# Patient Record
Sex: FEMALE | Race: WHITE | NOT HISPANIC OR LATINO | Employment: STUDENT | URBAN - METROPOLITAN AREA
[De-identification: names, ages, dates, MRNs, and addresses within clinical notes are randomized per-mention and may not be internally consistent; named-entity substitution may affect disease eponyms.]

---

## 2018-07-31 ENCOUNTER — OFFICE VISIT (OUTPATIENT)
Dept: FAMILY MEDICINE CLINIC | Facility: HOSPITAL | Age: 18
End: 2018-07-31
Payer: COMMERCIAL

## 2018-07-31 VITALS
DIASTOLIC BLOOD PRESSURE: 62 MMHG | HEIGHT: 65 IN | WEIGHT: 142 LBS | BODY MASS INDEX: 23.66 KG/M2 | TEMPERATURE: 98.5 F | SYSTOLIC BLOOD PRESSURE: 94 MMHG | HEART RATE: 81 BPM

## 2018-07-31 DIAGNOSIS — Z82.49 FAMILY HISTORY OF CARDIOVASCULAR DISEASE: ICD-10-CM

## 2018-07-31 DIAGNOSIS — N83.209 CYST OF OVARY, UNSPECIFIED LATERALITY: ICD-10-CM

## 2018-07-31 DIAGNOSIS — Z00.00 ENCOUNTER FOR WELLNESS EXAMINATION IN ADULT: Primary | ICD-10-CM

## 2018-07-31 PROBLEM — N92.0 MENORRHAGIA WITH REGULAR CYCLE: Status: ACTIVE | Noted: 2018-07-31

## 2018-07-31 PROCEDURE — 99203 OFFICE O/P NEW LOW 30 MIN: CPT | Performed by: INTERNAL MEDICINE

## 2018-07-31 NOTE — PROGRESS NOTES
Assessment/Plan:             Problem List Items Addressed This Visit        Genitourinary    Ovarian cyst     Seen by [facility] for evaluation May 2018- no hx of abnormal  Pap  Other    Family history of cardiovascular disease     Will check lipid profile           Other Visit Diagnoses     Encounter for wellness examination in adult    -  Primary            Subjective:      Patient ID: Manuelito Jara is a 21 y o  female    1  New patient here for intial wellness visit- at [facility] in [place]- plays soccer- defender- no injuries or joint pains  Has school physical to complete as well we are awaiting her records of immunization from Dr Liseth Kay office  2  Back pain abdominal pain with fever- went to urgent care in [place] and had temp to 104 - was sent to ER at [facility] and had workup which showed an ovairain cyst and then discharged home with no antibiotics-had 2  uti in past year  ( is sexually acitive)  Did have labs in Er        The following portions of the patient's history were reviewed and updated as appropriate: allergies, current medications and problem list    Has o 1 borother in good health  Review of Systems   Constitutional: Negative for fatigue  Genitourinary: Positive for menstrual problem  Some heavy flow for 1-2 days and some cramping- uses advil prn  Sees [facility]  All other systems reviewed and are negative  Objective:    No current outpatient prescriptions on file  Blood pressure 94/62, pulse 81, temperature 98 5 °F (36 9 °C), temperature source Tympanic, height 5' 5" (1 651 m), weight 64 4 kg (142 lb)  Physical Exam   Constitutional: She is oriented to person, place, and time  She appears well-developed and well-nourished  No distress  HENT:   Head: Normocephalic     Right Ear: External ear normal    Left Ear: External ear normal    Mouth/Throat: Oropharynx is clear and moist    Eyes: EOM are normal  Pupils are equal, round, and reactive to light  Right eye exhibits no discharge  Left eye exhibits no discharge  Neck: No JVD present  Cardiovascular: Normal rate, regular rhythm and normal heart sounds  Exam reveals no friction rub  No murmur heard  Pulmonary/Chest: Effort normal and breath sounds normal  No respiratory distress  She has no wheezes  Abdominal: Soft  Bowel sounds are normal  She exhibits no distension  There is no tenderness  Musculoskeletal: Normal range of motion  She exhibits no edema or deformity  No joint swelling or tenderness  No scoliosis of spine noted   Lymphadenopathy:     She has no cervical adenopathy  Neurological: She is alert and oriented to person, place, and time  No cranial nerve deficit  Coordination normal    Skin: Skin is warm and dry  No erythema  No pallor  Psychiatric: She has a normal mood and affect  Her behavior is normal  Thought content normal    Nursing note and vitals reviewed

## 2018-07-31 NOTE — PATIENT INSTRUCTIONS
Sign release of records from ER visit earlier this year at [facility] including labs and radiology reports please  Will have labs for lipid done in future as father has hyperlipidemia   Mother has PCOS-   Get flu shot in fall

## 2018-09-25 ENCOUNTER — OFFICE VISIT (OUTPATIENT)
Dept: FAMILY MEDICINE CLINIC | Facility: HOSPITAL | Age: 18
End: 2018-09-25
Payer: COMMERCIAL

## 2018-09-25 VITALS
DIASTOLIC BLOOD PRESSURE: 60 MMHG | SYSTOLIC BLOOD PRESSURE: 106 MMHG | HEIGHT: 65 IN | OXYGEN SATURATION: 99 % | HEART RATE: 90 BPM | BODY MASS INDEX: 23.49 KG/M2 | WEIGHT: 141 LBS

## 2018-09-25 DIAGNOSIS — F33.2 SEVERE EPISODE OF RECURRENT MAJOR DEPRESSIVE DISORDER, WITHOUT PSYCHOTIC FEATURES (HCC): Primary | ICD-10-CM

## 2018-09-25 PROCEDURE — 3008F BODY MASS INDEX DOCD: CPT | Performed by: PHYSICIAN ASSISTANT

## 2018-09-25 PROCEDURE — 99213 OFFICE O/P EST LOW 20 MIN: CPT | Performed by: PHYSICIAN ASSISTANT

## 2018-09-25 RX ORDER — CITALOPRAM 10 MG/1
10 TABLET ORAL DAILY
Qty: 30 TABLET | Refills: 2 | Status: SHIPPED | OUTPATIENT
Start: 2018-09-25 | End: 2018-10-11 | Stop reason: SDUPTHER

## 2018-09-25 NOTE — PROGRESS NOTES
Assessment/Plan:         Diagnoses and all orders for this visit:    Severe episode of recurrent major depressive disorder, without psychotic features ([facility] )  -     citalopram (CeleXA) 10 mg tablet; Take 1 tablet (10 mg total) by mouth daily        Subjective:      Patient ID: Luis Mcclelland is a 19 y o  female  19year old white female presents with mother;  Feeling depressed, worse past few weeks;    Per mother-  Anxiety and depression runs in the family, patient had anxiety after starting school  First year of college, last year, patient felt anxious and somewhat depressed, but did not want to take any medications  This is second year in college, going to [employer],  [employer]  Having trouble sleeping, low appetite, (skipping meals);   low motivation, "I just don't want to do anything"  Has to play soccer to pay for tuition, has to practice daily, including Sunday, and Saturday has games  Has trouble focusing in class, but able to keep up with school work  Tried few days of dad's Lexapro (generic)  Talks to , counseling  LNMP- 9/24/18  Depression   Associated symptoms include congestion, fatigue, nausea and a sore throat  Pertinent negatives include no abdominal pain, coughing or vomiting  Review of Systems   Constitutional: Positive for appetite change and fatigue  HENT: Positive for congestion, rhinorrhea and sore throat  Negative for ear pain  Respiratory: Negative for cough and shortness of breath  Gastrointestinal: Positive for nausea  Negative for abdominal pain and vomiting  Psychiatric/Behavioral: Positive for confusion, decreased concentration, depression, self-injury, sleep disturbance and suicidal ideas  Crashing car, trying to end life  Denies being in any crashes             Objective:      /60 (BP Location: Left arm, Patient Position: Sitting, Cuff Size: Standard)   Pulse 90   Ht 5' 5" (1 651 m)   Wt 64 kg (141 lb)   SpO2 99%   BMI 23 46 kg/m²          Physical Exam   Constitutional: She is oriented to person, place, and time  She appears well-developed and well-nourished  No distress  HENT:   Head: Normocephalic and atraumatic  Eyes: Conjunctivae and EOM are normal  Right eye exhibits no discharge  Left eye exhibits no discharge  No scleral icterus  Cardiovascular: Normal rate, regular rhythm and normal heart sounds  Pulmonary/Chest: Effort normal and breath sounds normal  No respiratory distress  She has no wheezes  She has no rales  Musculoskeletal: Normal range of motion  Neurological: She is alert and oriented to person, place, and time  Skin: She is not diaphoretic  Psychiatric: Her behavior is normal  Judgment and thought content normal    Low mood  Nursing note and vitals reviewed

## 2018-09-25 NOTE — PATIENT INSTRUCTIONS
Given info on depression  Patient to start Celexa daily, can try Melatonin for sleep  Recommend counseling, in addition to treatment, and working on stress relieving activities

## 2018-10-04 ENCOUNTER — TELEPHONE (OUTPATIENT)
Dept: FAMILY MEDICINE CLINIC | Facility: HOSPITAL | Age: 18
End: 2018-10-04

## 2018-10-04 NOTE — TELEPHONE ENCOUNTER
Discussed daughter's condition  Recommend therapy, crisis center,  And possible ER/hospital visit  Mom plans to go  patient, to bring her home for the weekend  Increase Celexa to 2 daily, and make apt  For next week

## 2018-10-11 DIAGNOSIS — F33.2 SEVERE EPISODE OF RECURRENT MAJOR DEPRESSIVE DISORDER, WITHOUT PSYCHOTIC FEATURES (HCC): ICD-10-CM

## 2018-10-11 RX ORDER — CITALOPRAM 10 MG/1
TABLET ORAL
Qty: 30 TABLET | Refills: 1 | Status: SHIPPED | OUTPATIENT
Start: 2018-10-11 | End: 2018-10-25 | Stop reason: SDUPTHER

## 2018-10-25 ENCOUNTER — OFFICE VISIT (OUTPATIENT)
Dept: FAMILY MEDICINE CLINIC | Facility: HOSPITAL | Age: 18
End: 2018-10-25
Payer: COMMERCIAL

## 2018-10-25 VITALS
WEIGHT: 144 LBS | BODY MASS INDEX: 23.99 KG/M2 | SYSTOLIC BLOOD PRESSURE: 102 MMHG | DIASTOLIC BLOOD PRESSURE: 68 MMHG | OXYGEN SATURATION: 98 % | HEART RATE: 65 BPM | HEIGHT: 65 IN

## 2018-10-25 DIAGNOSIS — F33.2 SEVERE EPISODE OF RECURRENT MAJOR DEPRESSIVE DISORDER, WITHOUT PSYCHOTIC FEATURES (HCC): ICD-10-CM

## 2018-10-25 DIAGNOSIS — Z23 NEED FOR INFLUENZA VACCINATION: Primary | ICD-10-CM

## 2018-10-25 PROCEDURE — 99213 OFFICE O/P EST LOW 20 MIN: CPT | Performed by: PHYSICIAN ASSISTANT

## 2018-10-25 PROCEDURE — 90471 IMMUNIZATION ADMIN: CPT

## 2018-10-25 PROCEDURE — 1036F TOBACCO NON-USER: CPT | Performed by: PHYSICIAN ASSISTANT

## 2018-10-25 PROCEDURE — 90682 RIV4 VACC RECOMBINANT DNA IM: CPT

## 2018-10-25 PROCEDURE — 3008F BODY MASS INDEX DOCD: CPT | Performed by: PHYSICIAN ASSISTANT

## 2018-10-25 RX ORDER — CITALOPRAM 20 MG/1
TABLET ORAL
Qty: 30 TABLET | Refills: 2 | Status: SHIPPED | OUTPATIENT
Start: 2018-10-25 | End: 2018-11-23 | Stop reason: SDUPTHER

## 2018-10-25 NOTE — PROGRESS NOTES
Assessment/Plan:         Diagnoses and all orders for this visit:    Need for influenza vaccination  -     influenza vaccine, [id number], quadrivalent, recombinant, PF, 0 5 mL, for patients 18-49 yr with comorbidities (FLUBLOK)    Severe episode of recurrent major depressive disorder, without psychotic features ([facility] )  -     citalopram (CeleXA) 20 mg tablet; 1 tab daily        Subjective:      Patient ID: Wolf Pérez is a 19 y o  female  19year old white female presents for follow up on depression  Mom called recently with concerns about patient being suicidal even while on medication  Patient  broke up with boyfriend about 2 1/2 months ago, and felt really down recently  Recommended on the phone about 2 weeks ago, to increase Celexa to 20 mg  Daily  Now patient states feeling much better, and has started therapy with college therapist and  at [facility]  Has to go back to campus today  Has soccer meets on Saturday, and practice daily  Tues and Thurs has morning breaks  Denies increased anxiety or panic attacks, no trouble focusing, motivation is good, energy is good  Has vivid dreams, and wakes early in am    Breakfast- cereal, coffee; lunch- sandwich/soup/salad; dinner- sandwich, protein  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, and has soccer daily, one day off every 2 weeks;  Practices 3 hours daily; competition is Saturday, and 2 during the week also  Has to travel on occasion on Saturdays for competitions  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; is able to get homework done  Has soccer scholarship to pay for college, in [address]  Review of Systems   Constitutional: Negative for appetite change, chills, diaphoresis, fatigue and fever  Respiratory: Negative for cough and shortness of breath  Gastrointestinal: Negative for abdominal pain, nausea and vomiting  Musculoskeletal: Negative for arthralgias, back pain, myalgias, neck pain and neck stiffness     Psychiatric/Behavioral: Positive for sleep disturbance  Negative for confusion, decreased concentration, self-injury and suicidal ideas  The patient is not nervous/anxious  Having vivid dreams and waking up at 4 am, unable to go back to sleep  Objective:      /68   Pulse 65   Ht 5' 5" (1 651 m)   Wt 65 3 kg (144 lb)   LMP 09/20/2018 (Exact Date)   SpO2 98%   BMI 23 96 kg/m²          Physical Exam   Constitutional: She is oriented to person, place, and time  She appears well-developed and well-nourished  No distress  HENT:   Head: Normocephalic and atraumatic  Eyes: Conjunctivae and EOM are normal  Right eye exhibits no discharge  Left eye exhibits no discharge  No scleral icterus  Cardiovascular: Normal rate, regular rhythm and normal heart sounds  Pulmonary/Chest: Effort normal and breath sounds normal  No respiratory distress  She has no wheezes  She has no rales  Musculoskeletal: Normal range of motion  She exhibits no edema  Neurological: She is alert and oriented to person, place, and time  Skin: She is not diaphoretic  Psychiatric: She has a normal mood and affect  Her behavior is normal  Judgment and thought content normal    Appears calm, good eye contact, relaxed, positive, stable  Nursing note and vitals reviewed

## 2018-10-25 NOTE — PATIENT INSTRUCTIONS
Continue Celexa at 20 mg  Qd, in the morning, and try Melatonin at night 5-10 mg  Continue therapy, and can call office with questions or any sx  Of concern  Continue to have outlets or stress relieving activities

## 2018-11-23 DIAGNOSIS — F33.2 SEVERE EPISODE OF RECURRENT MAJOR DEPRESSIVE DISORDER, WITHOUT PSYCHOTIC FEATURES (HCC): ICD-10-CM

## 2018-11-23 RX ORDER — CITALOPRAM 20 MG/1
TABLET ORAL
Qty: 90 TABLET | Refills: 1 | Status: SHIPPED | OUTPATIENT
Start: 2018-11-23 | End: 2019-06-03 | Stop reason: SDUPTHER

## 2019-01-04 ENCOUNTER — OFFICE VISIT (OUTPATIENT)
Dept: FAMILY MEDICINE CLINIC | Facility: HOSPITAL | Age: 19
End: 2019-01-04
Payer: COMMERCIAL

## 2019-01-04 VITALS
HEIGHT: 65 IN | DIASTOLIC BLOOD PRESSURE: 68 MMHG | WEIGHT: 146 LBS | HEART RATE: 72 BPM | SYSTOLIC BLOOD PRESSURE: 102 MMHG | BODY MASS INDEX: 24.32 KG/M2 | RESPIRATION RATE: 16 BRPM

## 2019-01-04 DIAGNOSIS — F33.2 SEVERE EPISODE OF RECURRENT MAJOR DEPRESSIVE DISORDER, WITHOUT PSYCHOTIC FEATURES (HCC): ICD-10-CM

## 2019-01-04 PROCEDURE — 99213 OFFICE O/P EST LOW 20 MIN: CPT | Performed by: PHYSICIAN ASSISTANT

## 2019-01-04 PROCEDURE — 3008F BODY MASS INDEX DOCD: CPT | Performed by: PHYSICIAN ASSISTANT

## 2019-01-04 PROCEDURE — 1036F TOBACCO NON-USER: CPT | Performed by: PHYSICIAN ASSISTANT

## 2019-01-04 NOTE — PROGRESS NOTES
Assessment/Plan:         Diagnoses and all orders for this visit:    Severe episode of recurrent major depressive disorder, without psychotic features ([facility] )        Subjective:      Patient ID: Glendy Cox is a 19 y o  female  19year old white female presents for follow up  Currently on winter break, working at [employer],  Part time (25 hours a week), able to manage schedule  Has to pay for living off campus  Classes start Monday, January 7th  Mood-  Good, anxiety low, under control  Sleep not good, wakes up often, sweating,  and has weird dreams  Gets panic attacks, twice a month, less than before  Was home 2 weeks  Went [place] ([facility] with friend, and went to [place] with family during break  Sees counselor on campus once a week, which helps  Back to practicing for soccer  Has a good support system, comes home once a month for break  Patient currently a sophomore  Review of Systems   Constitutional: Negative for appetite change and fatigue  Respiratory: Negative for cough and shortness of breath  Gastrointestinal: Negative for abdominal pain, constipation, diarrhea, nausea and vomiting  Psychiatric/Behavioral: Positive for sleep disturbance  Negative for agitation, decreased concentration, self-injury and suicidal ideas  The patient is nervous/anxious  Objective:      /68   Pulse 72   Resp 16   Ht 5' 5" (1 651 m)   Wt 66 2 kg (146 lb)   BMI 24 30 kg/m²          Physical Exam   Constitutional: She is oriented to person, place, and time  She appears well-developed and well-nourished  No distress  Cardiovascular: Normal rate, regular rhythm and normal heart sounds  Pulmonary/Chest: Effort normal and breath sounds normal  No respiratory distress  She has no wheezes  She has no rales  Musculoskeletal: Normal range of motion  She exhibits no edema  Neurological: She is alert and oriented to person, place, and time     Skin: She is not diaphoretic  Psychiatric: She has a normal mood and affect  Her behavior is normal  Judgment and thought content normal    Nursing note and vitals reviewed

## 2019-01-04 NOTE — PATIENT INSTRUCTIONS
Continue Celexa 20 mg, but to try taking in the morning, instead of at night  Recommend Melatonin to help with sleep  Continue with outlets/activities, and counseling  Patient to monitor sx  And call if sx   worsen, and anxiety increases

## 2019-06-03 DIAGNOSIS — F33.2 SEVERE EPISODE OF RECURRENT MAJOR DEPRESSIVE DISORDER, WITHOUT PSYCHOTIC FEATURES (HCC): ICD-10-CM

## 2019-06-03 RX ORDER — CITALOPRAM 20 MG/1
TABLET ORAL
Qty: 90 TABLET | Refills: 1 | Status: SHIPPED | OUTPATIENT
Start: 2019-06-03 | End: 2019-07-19 | Stop reason: SDUPTHER

## 2019-07-19 ENCOUNTER — OFFICE VISIT (OUTPATIENT)
Dept: FAMILY MEDICINE CLINIC | Facility: HOSPITAL | Age: 19
End: 2019-07-19
Payer: COMMERCIAL

## 2019-07-19 ENCOUNTER — TELEPHONE (OUTPATIENT)
Dept: FAMILY MEDICINE CLINIC | Facility: HOSPITAL | Age: 19
End: 2019-07-19

## 2019-07-19 VITALS
HEART RATE: 80 BPM | BODY MASS INDEX: 26.19 KG/M2 | DIASTOLIC BLOOD PRESSURE: 58 MMHG | HEIGHT: 65 IN | WEIGHT: 157.2 LBS | SYSTOLIC BLOOD PRESSURE: 110 MMHG

## 2019-07-19 DIAGNOSIS — F41.9 ANXIETY: ICD-10-CM

## 2019-07-19 DIAGNOSIS — F33.2 SEVERE EPISODE OF RECURRENT MAJOR DEPRESSIVE DISORDER, WITHOUT PSYCHOTIC FEATURES (HCC): ICD-10-CM

## 2019-07-19 DIAGNOSIS — F41.9 ANXIETY: Primary | ICD-10-CM

## 2019-07-19 PROCEDURE — 1036F TOBACCO NON-USER: CPT | Performed by: PHYSICIAN ASSISTANT

## 2019-07-19 PROCEDURE — 99213 OFFICE O/P EST LOW 20 MIN: CPT | Performed by: PHYSICIAN ASSISTANT

## 2019-07-19 PROCEDURE — 3008F BODY MASS INDEX DOCD: CPT | Performed by: PHYSICIAN ASSISTANT

## 2019-07-19 RX ORDER — CLONIDINE HYDROCHLORIDE 0.1 MG/1
0.1 TABLET ORAL 2 TIMES DAILY PRN
Qty: 30 TABLET | Refills: 2 | Status: SHIPPED | OUTPATIENT
Start: 2019-07-19 | End: 2019-07-19 | Stop reason: SDUPTHER

## 2019-07-19 RX ORDER — CITALOPRAM 20 MG/1
TABLET ORAL
Qty: 90 TABLET | Refills: 1 | Status: SHIPPED | OUTPATIENT
Start: 2019-07-19 | End: 2020-11-13

## 2019-07-19 RX ORDER — CLONIDINE HYDROCHLORIDE 0.1 MG/1
TABLET ORAL
Qty: 30 TABLET | Refills: 2 | Status: SHIPPED | OUTPATIENT
Start: 2019-07-19 | End: 2019-08-21 | Stop reason: SDUPTHER

## 2019-07-19 NOTE — PATIENT INSTRUCTIONS
Recommend starting Clonidine 0 1 mg  1/2 tab  Bedtime, and 1/2 tab  During the day prn  Recommend increasing outlets and activities, socializing more  Continue therapy/counseling

## 2019-07-19 NOTE — PROGRESS NOTES
Assessment/Plan:         Diagnoses and all orders for this visit:    Depression    Anxiety  -     cloNIDine (CATAPRES) 0 1 mg tablet; Take 1 tablet (0 1 mg total) by mouth 2 (two) times a day as needed (panic and anxiety) 1/2 tablet bid, prn anxiety  Subjective:      Patient ID: Sachin Neal is a 19 y o  female  19year old white female presents for follow up on depression  Anxiety more than usual, with panic attacks 1-2 times a week  Mood much improved, denies feeling depressed  Appetite good, sleep -  Bad/weird dreams, still seeing counselor in college  Wakes up early due to bad dreams, able to go back to sleep, gets full night sleep  Outlets/activities, college soccer, and work  Will be cleo this fall, living in apartment near [place]  Friends/family functions on occasion  Dreams cause stress, wakes up panicked in a sweat  Works full time  Admits to having trauma in past           Review of Systems   Constitutional: Negative for appetite change  Gastrointestinal: Negative for abdominal pain, constipation, diarrhea, nausea and vomiting  Musculoskeletal: Negative for arthralgias, myalgias, neck pain and neck stiffness  Neurological: Negative for dizziness, light-headedness and headaches  Psychiatric/Behavioral: Positive for agitation and sleep disturbance  Negative for decreased concentration, self-injury and suicidal ideas  The patient is nervous/anxious  Objective:      /58   Pulse 80   Ht 5' 5" (1 651 m)   Wt 71 3 kg (157 lb 3 2 oz)   BMI 26 16 kg/m²          Physical Exam   Constitutional: She is oriented to person, place, and time  She appears well-developed and well-nourished  No distress  Cardiovascular: Normal rate, regular rhythm and normal heart sounds  Pulmonary/Chest: Effort normal and breath sounds normal  No stridor  No respiratory distress  She has no wheezes  She has no rales     Neurological: She is alert and oriented to person, place, and time  Skin: She is not diaphoretic  Psychiatric: She has a normal mood and affect  Her behavior is normal  Judgment and thought content normal    Nursing note and vitals reviewed

## 2019-08-21 DIAGNOSIS — F41.9 ANXIETY: ICD-10-CM

## 2019-08-21 RX ORDER — CLONIDINE HYDROCHLORIDE 0.1 MG/1
TABLET ORAL
Qty: 90 TABLET | Refills: 1 | Status: SHIPPED | OUTPATIENT
Start: 2019-08-21

## 2019-10-19 ENCOUNTER — TELEPHONE (OUTPATIENT)
Dept: OTHER | Facility: OTHER | Age: 19
End: 2019-10-19

## 2019-11-30 DIAGNOSIS — F33.2 SEVERE EPISODE OF RECURRENT MAJOR DEPRESSIVE DISORDER, WITHOUT PSYCHOTIC FEATURES (HCC): ICD-10-CM

## 2019-12-02 RX ORDER — CITALOPRAM 20 MG/1
TABLET ORAL
Qty: 90 TABLET | Refills: 1 | Status: SHIPPED | OUTPATIENT
Start: 2019-12-02 | End: 2020-11-13

## 2020-05-18 DIAGNOSIS — F33.2 SEVERE EPISODE OF RECURRENT MAJOR DEPRESSIVE DISORDER, WITHOUT PSYCHOTIC FEATURES (HCC): ICD-10-CM

## 2020-05-18 RX ORDER — CITALOPRAM 20 MG/1
TABLET ORAL
Qty: 90 TABLET | Refills: 1 | Status: SHIPPED | OUTPATIENT
Start: 2020-05-18 | End: 2020-11-13

## 2020-11-13 DIAGNOSIS — F33.2 SEVERE EPISODE OF RECURRENT MAJOR DEPRESSIVE DISORDER, WITHOUT PSYCHOTIC FEATURES (HCC): ICD-10-CM

## 2020-11-13 RX ORDER — CITALOPRAM 20 MG/1
TABLET ORAL
Qty: 90 TABLET | Refills: 0 | Status: SHIPPED | OUTPATIENT
Start: 2020-11-13 | End: 2021-02-11 | Stop reason: SDUPTHER

## 2021-02-11 DIAGNOSIS — F33.2 SEVERE EPISODE OF RECURRENT MAJOR DEPRESSIVE DISORDER, WITHOUT PSYCHOTIC FEATURES (HCC): ICD-10-CM

## 2021-02-13 RX ORDER — CITALOPRAM 20 MG/1
TABLET ORAL
Qty: 90 TABLET | Refills: 1 | Status: SHIPPED | OUTPATIENT
Start: 2021-02-13 | End: 2021-02-17 | Stop reason: SDUPTHER

## 2021-02-15 NOTE — TELEPHONE ENCOUNTER
Lm on [phone] for PT to call and set up a 6 month appt with either Dr Iman Ozuna or Bridgette Mora

## 2021-02-17 ENCOUNTER — TELEMEDICINE (OUTPATIENT)
Dept: FAMILY MEDICINE CLINIC | Facility: HOSPITAL | Age: 21
End: 2021-02-17
Payer: COMMERCIAL

## 2021-02-17 DIAGNOSIS — F33.2 SEVERE EPISODE OF RECURRENT MAJOR DEPRESSIVE DISORDER, WITHOUT PSYCHOTIC FEATURES (HCC): ICD-10-CM

## 2021-02-17 PROCEDURE — 99213 OFFICE O/P EST LOW 20 MIN: CPT | Performed by: PHYSICIAN ASSISTANT

## 2021-02-17 RX ORDER — CITALOPRAM 20 MG/1
TABLET ORAL
Qty: 90 TABLET | Refills: 3 | Status: SHIPPED | OUTPATIENT
Start: 2021-02-17

## 2021-02-17 NOTE — PROGRESS NOTES
Virtual Regular Visit      Assessment/Plan:    Problem List Items Addressed This Visit     None      Visit Diagnoses     Severe episode of recurrent major depressive disorder, without psychotic features ([facility] )        Relevant Medications    citalopram (CeleXA) 20 mg tablet      Symptoms controlled with medication, patient graduates from college, this coming May  Reason for visit is   Chief Complaint   Patient presents with    Virtual Regular Visit        Encounter provider Yasmin Yang PA-C    Provider located at [place] [address] [place] [address]      Recent Visits  No visits were found meeting these conditions  Showing recent visits within past 7 days and meeting all other requirements     Today's Visits  Date Type Provider Dept   02/17/21 Telemedicine Yasmin Yang PA-C [facility]   Showing today's visits and meeting all other requirements     Future Appointments  No visits were found meeting these conditions  Showing future appointments within next 150 days and meeting all other requirements        The patient was identified by name and date of birth  Mia Sparks was informed that this is a telemedicine visit and that the visit is being conducted through [address] [place] [address] and patient was informed that this is not a secure, HIPAA-compliant platform  She agrees to proceed     My office door was closed  No one else was in the room  She acknowledged consent and understanding of privacy and security of the video platform  The patient has agreed to participate and understands they can discontinue the visit at any time  Patient is aware this is a billable service  Subjective  Mia Sparks is a 23 y o  female   Patient goes to [employer], plans to graduating in May  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  Needs refill on Celexa    Was seeing therapist/counselor, but not any more  Has a good support system  Past Medical History:   Diagnosis Date    Depression        Past Surgical History:   Procedure Laterality Date    NO PAST SURGERIES         Current Outpatient Medications   Medication Sig Dispense Refill    citalopram (CeleXA) 20 mg tablet TAKE 1 TABLET DAILY 90 tablet 3    cloNIDine (CATAPRES) 0 1 mg tablet 1/2 tablet bid, prn anxiety  90 tablet 1     No current facility-administered medications for this visit  No Known Allergies    Review of Systems   Constitutional: Negative for chills, diaphoresis, fatigue and fever  Respiratory: Negative for cough, chest tightness and shortness of breath  Gastrointestinal: Negative for abdominal pain, constipation, diarrhea, nausea and vomiting  Psychiatric/Behavioral: Positive for dysphoric mood  Negative for decreased concentration, self-injury, sleep disturbance and suicidal ideas  The patient is not nervous/anxious  Patient's mood is under control with celexa  Video Exam    There were no vitals filed for this visit  Physical Exam  Constitutional:       General: She is not in acute distress  Appearance: Normal appearance  She is not ill-appearing, toxic-appearing or diaphoretic  Cardiovascular:      Pulses: Normal pulses  Heart sounds: Normal heart sounds  No murmur  No friction rub  No gallop  Pulmonary:      Effort: Pulmonary effort is normal  No respiratory distress  Breath sounds: Normal breath sounds  No stridor  No wheezing, rhonchi or rales  Chest:      Chest wall: No tenderness  Musculoskeletal: Normal range of motion  General: No swelling, tenderness, deformity or signs of injury  Right lower leg: No edema  Left lower leg: No edema  Neurological:      General: No focal deficit present  Mental Status: She is alert and oriented to person, place, and time  Cranial Nerves: No cranial nerve deficit        Sensory: No sensory deficit  Motor: No weakness  Coordination: Coordination normal    Psychiatric:         Mood and Affect: Mood normal          Behavior: Behavior normal          Thought Content: Thought content normal          Judgment: Judgment normal           I spent 15 minutes with patient  VIRTUAL VISIT DISCLAIMER    Mia Sparks acknowledges that she has consented to an online visit or consultation  She understands that the online visit is based solely on information provided by her, and that, in the absence of a face-to-face physical evaluation by the physician, the diagnosis she receives is both limited and provisional in terms of accuracy and completeness  This is not intended to replace a full medical face-to-face evaluation by the physician  Mia Sparks understands and accepts these terms